# Patient Record
Sex: FEMALE | Race: WHITE | Employment: FULL TIME | URBAN - METROPOLITAN AREA
[De-identification: names, ages, dates, MRNs, and addresses within clinical notes are randomized per-mention and may not be internally consistent; named-entity substitution may affect disease eponyms.]

---

## 2019-02-13 ENCOUNTER — HOSPITAL ENCOUNTER (EMERGENCY)
Age: 34
Discharge: HOME | End: 2019-02-14
Attending: EMERGENCY MEDICINE

## 2019-02-13 VITALS
SYSTOLIC BLOOD PRESSURE: 134 MMHG | HEIGHT: 63 IN | WEIGHT: 190 LBS | HEART RATE: 116 BPM | TEMPERATURE: 98 F | DIASTOLIC BLOOD PRESSURE: 80 MMHG | OXYGEN SATURATION: 100 % | RESPIRATION RATE: 20 BRPM | BODY MASS INDEX: 33.66 KG/M2

## 2019-02-13 DIAGNOSIS — R20.0 NUMBNESS: Primary | ICD-10-CM

## 2019-02-13 PROCEDURE — 99283 EMERGENCY DEPT VISIT LOW MDM: CPT

## 2019-02-13 RX ORDER — BUPROPION HYDROCHLORIDE 150 MG/1
150 TABLET ORAL
COMMUNITY

## 2019-02-13 ASSESSMENT — PAIN SCALES - GENERAL: PAINLEVEL_OUTOF10: 5

## 2025-04-26 ENCOUNTER — HOSPITAL ENCOUNTER (EMERGENCY)
Facility: CLINIC | Age: 40
Discharge: HOME | End: 2025-04-26
Attending: EMERGENCY MEDICINE

## 2025-04-26 VITALS
HEIGHT: 63 IN | SYSTOLIC BLOOD PRESSURE: 106 MMHG | DIASTOLIC BLOOD PRESSURE: 65 MMHG | TEMPERATURE: 98.3 F | WEIGHT: 178 LBS | OXYGEN SATURATION: 98 % | RESPIRATION RATE: 18 BRPM | HEART RATE: 97 BPM | BODY MASS INDEX: 31.54 KG/M2

## 2025-04-26 DIAGNOSIS — S61.412A LACERATION OF SKIN OF LEFT PALM, INITIAL ENCOUNTER: Primary | ICD-10-CM

## 2025-04-26 PROCEDURE — 12002 RPR S/N/AX/GEN/TRNK2.6-7.5CM: CPT

## 2025-04-26 PROCEDURE — 6360000002 HC RX W HCPCS

## 2025-04-26 PROCEDURE — 99282 EMERGENCY DEPT VISIT SF MDM: CPT

## 2025-04-26 RX ORDER — LIDOCAINE HYDROCHLORIDE 10 MG/ML
20 INJECTION, SOLUTION INFILTRATION; PERINEURAL ONCE
Status: COMPLETED | OUTPATIENT
Start: 2025-04-26 | End: 2025-04-26

## 2025-04-26 RX ORDER — LIDOCAINE HYDROCHLORIDE 10 MG/ML
INJECTION, SOLUTION INFILTRATION; PERINEURAL
Status: COMPLETED
Start: 2025-04-26 | End: 2025-04-26

## 2025-04-26 RX ADMIN — LIDOCAINE HYDROCHLORIDE 20 ML: 10 INJECTION, SOLUTION INFILTRATION; PERINEURAL at 07:37

## 2025-04-26 ASSESSMENT — PAIN - FUNCTIONAL ASSESSMENT: PAIN_FUNCTIONAL_ASSESSMENT: 0-10

## 2025-04-26 ASSESSMENT — PAIN SCALES - GENERAL: PAINLEVEL_OUTOF10: 1

## 2025-04-26 NOTE — ED NOTES
Pt arrives via private auto, C/O lac to L hand, cut on box opener. Pt states happened \"few hours ago\". No active bleeding noted. Pt denies taking any blood thinners.

## 2025-04-26 NOTE — ED PROVIDER NOTES
[facility]  [address] [facility] [address]  Phone: [phone]      Patient Name:  Purvi Treviño  Medical Record Number:  [id number]  YOB: 1985  Date of Service:  4/26/2025  Primary Care Physician:  Alberto Pereira DO      CHIEF COMPLAINT:       Chief Complaint   Patient presents with    Laceration     L hand       HISTORY OF PRESENT ILLNESS:   Purvi Treviño is a 39 y.o. female who presents with the complaint of a left hand laceration.  The patient reports that around 5 AM she was at work and was using a new  when she accidentally laceration the palm of her left hand.  She is right-hand dominant.  She denies any previous injury or surgery to the left hand.  Her tetanus shot is up-to-date.  The patient has not taken any medications for pain and does not list any provoking or palliating factors.  She denies any concern for foreign body in the wound.  The patient denies fever, chills, headache, vision changes, neck pain, back pain, chest pain, shortness of breath, abdominal pain, focal weakness, numbness, tingling, nausea, vomiting, recent illness.    CURRENT MEDICATIONS:      Discharge Medication List as of 4/26/2025  7:41 AM        CONTINUE these medications which have NOT CHANGED    Details   buPROPion (WELLBUTRIN XL) 150 MG extended release tablet Take 150 mg by mouthHistorical Med             ALLERGIES:   has no known allergies.    PAST MEDICAL HISTORY:    has no past medical history on file.  The patient denies    SURGICAL HISTORY:      has no past surgical history on file.  The patient denies    FAMILY HISTORY:   has no family status information on file.      family history is not on file.    SOCIAL HISTORY:     reports that she has never smoked. She has never used smokeless tobacco. She reports that she does not drink alcohol and does not use drugs.    IMMUNIZATION HISTORY:      There is no immunization history on file for this patient.    PHYSICAL EXAM:    involvement, fracture, dislocation    PLAN:     Orders Placed This Encounter   Procedures    LACERATION REPAIR       MEDICATIONS ORDERED:     Orders Placed This Encounter   Medications    lidocaine 1 % injection     Criselda Durham: cabinet override    lidocaine 1 % injection 20 mL       DIAGNOSTIC RESULTS:     EKG   None    RADIOLOGY:     No orders to display         LABORATORY:   No results found for this visit on 04/26/25.    DEPARTMENT VITAL SIGNS:     Vitals:    04/26/25 0647   BP: 106/65   Pulse: 97   Resp: 18   Temp: 98.3 °F (36.8 °C)   TempSrc: Oral   SpO2: 98%   Weight: 80.7 kg (178 lb)   Height: 1.6 m (5' 3\")     BP: 106/65, Temp: 98.3 °F (36.8 °C), Pulse: 97, Respirations: 18     EMERGENCY DEPARTMENT COURSE:        CONSULTS:   None    CRITICAL CARE:   None    PROCEDURES:   Lac Repair    Date/Time: 4/26/2025 7:39 AM    Performed by: Juana Soto DO  Authorized by: Juana Soto DO    Consent:     Consent obtained:  Verbal    Consent given by:  Patient    Risks, benefits, and alternatives were discussed: yes      Risks discussed:  Infection, need for additional repair, nerve damage, pain, poor cosmetic result, poor wound healing, retained foreign body, tendon damage and vascular damage    Alternatives discussed:  No treatment, delayed treatment, observation and referral  Universal protocol:     Procedure explained and questions answered to patient or proxy's satisfaction: yes      Patient identity confirmed:  Arm band  Anesthesia:     Anesthesia method:  Local infiltration    Local anesthetic:  Lidocaine 1% w/o epi  Laceration details:     Location:  Hand    Hand location:  L palm    Length (cm):  6    Depth (mm):  3  Pre-procedure details:     Preparation:  Patient was prepped and draped in usual sterile fashion  Exploration:     Limited defect created (wound extended): no      Hemostasis achieved with:  Direct pressure    Imaging outcome: foreign body not noted      Wound exploration: wound